# Patient Record
Sex: MALE | Race: WHITE | NOT HISPANIC OR LATINO | Employment: FULL TIME | URBAN - METROPOLITAN AREA
[De-identification: names, ages, dates, MRNs, and addresses within clinical notes are randomized per-mention and may not be internally consistent; named-entity substitution may affect disease eponyms.]

---

## 2017-09-02 ENCOUNTER — PATIENT MESSAGE (OUTPATIENT)
Dept: FAMILY MEDICINE | Facility: CLINIC | Age: 42
End: 2017-09-02

## 2020-07-06 ENCOUNTER — OFFICE VISIT (OUTPATIENT)
Dept: URGENT CARE | Facility: CLINIC | Age: 45
End: 2020-07-06
Payer: COMMERCIAL

## 2020-07-06 VITALS
BODY MASS INDEX: 25.18 KG/M2 | HEART RATE: 84 BPM | RESPIRATION RATE: 16 BRPM | SYSTOLIC BLOOD PRESSURE: 138 MMHG | WEIGHT: 170 LBS | OXYGEN SATURATION: 100 % | DIASTOLIC BLOOD PRESSURE: 86 MMHG | TEMPERATURE: 98 F | HEIGHT: 69 IN
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
## 2020-07-06 DIAGNOSIS — S99.912A INJURY OF LEFT ANKLE, INITIAL ENCOUNTER: ICD-10-CM

## 2020-07-06 PROCEDURE — 73610 XR ANKLE COMPLETE 3 VIEW LEFT: ICD-10-PCS | Mod: FY,LT,S$GLB, | Performed by: RADIOLOGY

## 2020-07-06 PROCEDURE — 99214 OFFICE O/P EST MOD 30 MIN: CPT | Mod: S$GLB,,, | Performed by: NURSE PRACTITIONER

## 2020-07-06 PROCEDURE — 73610 X-RAY EXAM OF ANKLE: CPT | Mod: FY,LT,S$GLB, | Performed by: RADIOLOGY

## 2020-07-06 PROCEDURE — 99214 PR OFFICE/OUTPT VISIT, EST, LEVL IV, 30-39 MIN: ICD-10-PCS | Mod: S$GLB,,, | Performed by: NURSE PRACTITIONER

## 2020-07-06 RX ORDER — NAPROXEN 500 MG/1
500 TABLET ORAL 2 TIMES DAILY WITH MEALS
Qty: 20 TABLET | Refills: 0 | Status: SHIPPED | OUTPATIENT
Start: 2020-07-06 | End: 2020-07-16

## 2020-07-06 RX ORDER — KETOROLAC TROMETHAMINE 30 MG/ML
30 INJECTION, SOLUTION INTRAMUSCULAR; INTRAVENOUS
Status: SHIPPED | OUTPATIENT
Start: 2020-07-06 | End: 2020-07-09

## 2020-07-06 NOTE — PATIENT INSTRUCTIONS
Please follow up with your Primary care provider within 2-5 days if your signs and symptoms have not resolved or worsen.     If your condition worsens or fails to improve we recommend that you receive another evaluation at the emergency room immediately or contact your primary medical clinic to discuss your concerns.    You must understand that you have received an Urgent Care treatment only and that you may be released before all of your medical problems are known or treated.   You, the patient, will arrange for follow up care as instructed.       ORTHO    R.I.C.E. to the affected joint or limb as needed:    Rest- the injured or sore extremity.  Ice- for the next 24-48 hours, alternating 20 minutes on and 20 minutes off as needed up to 3 times a day.   Compression-Use bandages to stabilize injured limb.  Check circulation to make sure  bandage is not too tight.    Elevate-when possible to reduce swelling.      Ice 20 minutes on and 20 minutes off as needed for pain.     Please drink plenty of fluids.    Please get plenty of rest.    Please return here or go to the Emergency Department for any concerns or worsening of condition.    Please be aware that narcotics can be addictive. If I gave you narcotics, I have given you a limited quantity to take as it is needed at this time. However take it sparingly and only when needed.  Do not operate machinery or drive on this medication.      If you were not prescribed an anti-inflammatory medication, and if you do not have any history of stomach/intestinal ulcers, or kidney disease, or are not taking a blood thinner such as Coumadin, Plavix, Pradaxa, Eloquis, or Xaralta for example, it is OK to take over the counter Ibuprofen or Advil or Motrin or Aleve as directed.  Do not take these medications on an empty stomach.    If you were given a splint wear it at all times unless otherwise instructed.     If you were given crutches use them as we instructed. Do not rest your  armpits on the foam pad or you risk compressing the nerves and the vessels there.    Please follow up with your primary care doctor or specialist as needed.    If you lose control of your bowel and/or bladder, please go to the nearest Emergency Department immediately.  If you lose sensation in between your legs by your genitalia and/or rectum, please go to the nearest Emergency Department immediately.  If you lose control or sensation of any extremity, please go to the nearest Emergency Department immediately.      Understanding Ankle Sprain    The ankle is the joint where the leg and foot meet. Bones are held in place by connective tissue called ligaments. When ankle ligaments are stretched to the point of pain and injury, it is called an ankle sprain. A sprain can tear the ligaments. These tears can be very small but still cause pain. Ankle sprains can be mild or severe.  What causes an ankle sprain?  A sprain may occur when you twist your ankle or bend it too far. This can happen when you stumble or fall. Things that can make an ankle sprain more likely include:  · Having had an ankle sprain before  · Playing sports that involve running and jumping. Or playing contact sports such as football or hockey.  · Wearing shoes that dont support your feet and ankles well  · Having ankles with poor strength and flexibility  Symptoms of an ankle sprain  Symptoms may include:  · Pain or soreness in the ankle  · Swelling  · Redness or bruising  · Not being able to walk or put weight on the affected foot  · Reduced range of motion in the ankle  · A popping or tearing feeling at the time the sprain occurs  · An abnormal or dislocated look to the ankle  · Instability or too much range of motion in the ankle  Treatment for an ankle sprain  Treatment focuses on reducing pain and swelling, and avoiding further injury. Treatments may include:  · Resting the ankle. Avoid putting weight on it. This may mean using crutches until the  sprain heals.  · Prescription or over-the-counter pain medicines. These help reduce swelling and pain.  · Cold packs. These help reduce pain and swelling.  · Raising your ankle above your heart. This helps reduce swelling.  · Wrapping the ankle with an elastic bandage or ankle brace. This helps reduce swelling and gives some support to the ankle. In rare cases, you may need a cast or boot.  · Stretching and other exercises. These improve flexibility and strength.  · Heat packs. These may be recommended before doing ankle exercises.  Possible complications of an ankle sprain  An ankle that has been weakened by a sprain can be more likely to have repeated sprains afterward. Doing exercises to strengthen your ankle and improve balance can reduce your risk for repeated sprains. Other possible complications are long-term (chronic) pain or an ankle that remains unstable.  When to call your healthcare provider  Call your healthcare provider right away if you have any of these:  · Fever of 100.4°F (38°C) or higher, or as directed  · Pain, numbness, discoloration, or coldness in the foot or toes  · Pain that gets worse  · Symptoms that dont get better, or get worse  · New symptoms   Date Last Reviewed: 3/10/2016  © 1125-6409 [employer]. [address] [place] [address], [place] [address]. All rights reserved. This information is not intended as a substitute for professional medical care. Always follow your healthcare professional's instructions.

## 2020-07-06 NOTE — PROGRESS NOTES
"Subjective:       Patient ID: Иван Leal is a 45 y.o. male.    Vitals:  height is 5' 9" (1.753 m) and weight is 77.1 kg (170 lb). His oral temperature is 98.4 °F (36.9 °C). His blood pressure is 138/86 and his pulse is 84. His respiration is 16 and oxygen saturation is 100%.     Chief Complaint: Trauma (left ankle)    Patient fell out of car backwards and hurt ankle. Patient did not hit head.  Ibuprofen 400 mg taken at 10AM      Trauma  The incident occurred 12 to 24 hours ago (@4:30 yesterday). The incident occurred at another residence. The injury mechanism was a fall. Context: Patient fell backwards out of car. No protective equipment was used. The pain is mild. It is unlikely that a foreign body is present. Associated symptoms include loss of consciousness. Pertinent negatives include no abdominal pain or light-headedness. There have been prior injuries to these areas. His tetanus status is UTD.       Constitution: Negative for fatigue.   HENT: Negative for facial swelling and facial trauma.    Neck: Negative for neck stiffness.   Cardiovascular: Negative for chest trauma.   Eyes: Negative for eye trauma.   Gastrointestinal: Negative for abdominal trauma, abdominal pain and rectal bleeding.   Genitourinary: Negative for hematuria, genital trauma and pelvic pain.   Musculoskeletal: Positive for trauma, joint swelling, abnormal ROM of joint and pain with walking. Negative for pain.        Left ankle   Skin: Negative for color change, wound, abrasion and laceration.   Neurological: Positive for loss of consciousness. Negative for dizziness, history of vertigo, light-headedness, passing out, coordination disturbances and altered mental status.   Hematologic/Lymphatic: Negative for history of bleeding disorder.   Psychiatric/Behavioral: Negative for altered mental status.       Objective:      Physical Exam   Constitutional: He is oriented to person, place, and time. He appears well-developed. He is cooperative. " No distress.   HENT:   Head: Normocephalic and atraumatic.   Nose: Nose normal.   Eyes: Conjunctivae and lids are normal.   Cardiovascular: Normal rate, regular rhythm, normal heart sounds and normal pulses.   Pulmonary/Chest: Effort normal and breath sounds normal.   Musculoskeletal:         General: No deformity.      Left foot: Decreased range of motion. Normal capillary refill. Tenderness (TTP, see diagram), bony tenderness and swelling present. No crepitus, deformity or laceration.        Feet:    Neurological: He is alert and oriented to person, place, and time. He has normal strength and normal reflexes. He is not disoriented. No sensory deficit.   Skin: Skin is warm, dry, intact and not diaphoretic. not left foot  Psychiatric: His speech is normal and behavior is normal. Judgment and thought content normal.   Nursing note and vitals reviewed.        Assessment:       1. Fall, initial encounter    2. Injury of left ankle, initial encounter        Plan:    Patient has crutches at the time of the visit.  Patient advised to use crutches as needed.  Discussed RICE.  Declined Toradol injection at this time.      X-ray Ankle Complete Left    Result Date: 7/6/2020  EXAMINATION: XR ANKLE COMPLETE 3 VIEW LEFT CLINICAL HISTORY: Unspecified fall, initial encounter TECHNIQUE: AP, lateral and oblique views of the left ankle were performed. COMPARISON: None FINDINGS: Bones are well mineralized. Overall alignment is within normal limits.  Nonspecific soft tissue swelling about the anterior and lateral aspect of the ankle.  Ankle mortise is otherwise well aligned and intact.  Suspected small bone island at the distal tibia meta epiphyseal junction.  No displaced fracture, dislocation or destructive osseous process. Joint spaces appear relatively maintained. No subcutaneous emphysema or radiodense retained foreign body.     Anterolateral left ankle nonspecific soft tissue swelling without acute displaced fracture-dislocation  identified, which may represent sprain. Electronically signed by: Ortiz Novak MD Date:    07/06/2020 Time:    16:18      Fall, initial encounter  -     X-Ray Ankle Complete Left; Future; Expected date: 07/06/2020  -     ketorolac injection 30 mg  -     naproxen (NAPROSYN) 500 MG tablet; Take 1 tablet (500 mg total) by mouth 2 (two) times daily with meals. As needed for pain. for 10 days  Dispense: 20 tablet; Refill: 0    Injury of left ankle, initial encounter  -     X-Ray Ankle Complete Left; Future; Expected date: 07/06/2020  -     ketorolac injection 30 mg  -     naproxen (NAPROSYN) 500 MG tablet; Take 1 tablet (500 mg total) by mouth 2 (two) times daily with meals. As needed for pain. for 10 days  Dispense: 20 tablet; Refill: 0      Patient Instructions     Please follow up with your Primary care provider within 2-5 days if your signs and symptoms have not resolved or worsen.     If your condition worsens or fails to improve we recommend that you receive another evaluation at the emergency room immediately or contact your primary medical clinic to discuss your concerns.    You must understand that you have received an Urgent Care treatment only and that you may be released before all of your medical problems are known or treated.   You, the patient, will arrange for follow up care as instructed.       ORTHO    R.I.C.E. to the affected joint or limb as needed:    Rest- the injured or sore extremity.  Ice- for the next 24-48 hours, alternating 20 minutes on and 20 minutes off as needed up to 3 times a day.   Compression-Use bandages to stabilize injured limb.  Check circulation to make sure  bandage is not too tight.    Elevate-when possible to reduce swelling.      Ice 20 minutes on and 20 minutes off as needed for pain.     Please drink plenty of fluids.    Please get plenty of rest.    Please return here or go to the Emergency Department for any concerns or worsening of condition.    Please be aware that  narcotics can be addictive. If I gave you narcotics, I have given you a limited quantity to take as it is needed at this time. However take it sparingly and only when needed.  Do not operate machinery or drive on this medication.      If you were not prescribed an anti-inflammatory medication, and if you do not have any history of stomach/intestinal ulcers, or kidney disease, or are not taking a blood thinner such as Coumadin, Plavix, Pradaxa, Eloquis, or Xaralta for example, it is OK to take over the counter Ibuprofen or Advil or Motrin or Aleve as directed.  Do not take these medications on an empty stomach.    If you were given a splint wear it at all times unless otherwise instructed.     If you were given crutches use them as we instructed. Do not rest your armpits on the foam pad or you risk compressing the nerves and the vessels there.    Please follow up with your primary care doctor or specialist as needed.    If you lose control of your bowel and/or bladder, please go to the nearest Emergency Department immediately.  If you lose sensation in between your legs by your genitalia and/or rectum, please go to the nearest Emergency Department immediately.  If you lose control or sensation of any extremity, please go to the nearest Emergency Department immediately.      Understanding Ankle Sprain    The ankle is the joint where the leg and foot meet. Bones are held in place by connective tissue called ligaments. When ankle ligaments are stretched to the point of pain and injury, it is called an ankle sprain. A sprain can tear the ligaments. These tears can be very small but still cause pain. Ankle sprains can be mild or severe.  What causes an ankle sprain?  A sprain may occur when you twist your ankle or bend it too far. This can happen when you stumble or fall. Things that can make an ankle sprain more likely include:  · Having had an ankle sprain before  · Playing sports that involve running and jumping. Or  playing contact sports such as football or hockey.  · Wearing shoes that dont support your feet and ankles well  · Having ankles with poor strength and flexibility  Symptoms of an ankle sprain  Symptoms may include:  · Pain or soreness in the ankle  · Swelling  · Redness or bruising  · Not being able to walk or put weight on the affected foot  · Reduced range of motion in the ankle  · A popping or tearing feeling at the time the sprain occurs  · An abnormal or dislocated look to the ankle  · Instability or too much range of motion in the ankle  Treatment for an ankle sprain  Treatment focuses on reducing pain and swelling, and avoiding further injury. Treatments may include:  · Resting the ankle. Avoid putting weight on it. This may mean using crutches until the sprain heals.  · Prescription or over-the-counter pain medicines. These help reduce swelling and pain.  · Cold packs. These help reduce pain and swelling.  · Raising your ankle above your heart. This helps reduce swelling.  · Wrapping the ankle with an elastic bandage or ankle brace. This helps reduce swelling and gives some support to the ankle. In rare cases, you may need a cast or boot.  · Stretching and other exercises. These improve flexibility and strength.  · Heat packs. These may be recommended before doing ankle exercises.  Possible complications of an ankle sprain  An ankle that has been weakened by a sprain can be more likely to have repeated sprains afterward. Doing exercises to strengthen your ankle and improve balance can reduce your risk for repeated sprains. Other possible complications are long-term (chronic) pain or an ankle that remains unstable.  When to call your healthcare provider  Call your healthcare provider right away if you have any of these:  · Fever of 100.4°F (38°C) or higher, or as directed  · Pain, numbness, discoloration, or coldness in the foot or toes  · Pain that gets worse  · Symptoms that dont get better, or get  worse  · New symptoms   Date Last Reviewed: 3/10/2016  © 2428-4562 [employer] [facility]. [address] [place] [address], [place] [address]. All rights reserved. This information is not intended as a substitute for professional medical care. Always follow your healthcare professional's instructions.

## 2021-04-16 ENCOUNTER — PATIENT MESSAGE (OUTPATIENT)
Dept: RESEARCH | Facility: HOSPITAL | Age: 46
End: 2021-04-16